# Patient Record
Sex: MALE | Race: WHITE | NOT HISPANIC OR LATINO | Employment: FULL TIME | URBAN - METROPOLITAN AREA
[De-identification: names, ages, dates, MRNs, and addresses within clinical notes are randomized per-mention and may not be internally consistent; named-entity substitution may affect disease eponyms.]

---

## 2023-05-08 LAB
AMPHETAMINE (PRESENCE) IN URINE BY SCREEN METHOD: NORMAL
BARBITURATES PRESENCE IN URINE BY SCREEN METHOD: NORMAL
BENZODIAZEPINE (PRESENCE) IN URINE BY SCREEN METHOD: NORMAL
CANNABINOIDS IN URINE BY SCREEN METHOD: NORMAL
COCAINE (PRESENCE) IN URINE BY SCREEN METHOD: NORMAL
DRUG SCREEN COMMENT URINE: NORMAL
FENTANYL URINE: NORMAL
METHADONE (PRESENCE) IN URINE BY SCREEN METHOD: NORMAL
OPIATES (PRESENCE) IN URINE BY SCREEN METHOD: NORMAL
OXYCODONE (PRESENCE) IN URINE BY SCREEN METHOD: NORMAL
PHENCYCLIDINE (PRESENCE) IN URINE BY SCREEN METHOD: NORMAL

## 2023-08-15 ENCOUNTER — OFFICE VISIT (OUTPATIENT)
Dept: PRIMARY CARE | Facility: CLINIC | Age: 28
End: 2023-08-15
Payer: COMMERCIAL

## 2023-08-15 VITALS
OXYGEN SATURATION: 98 % | HEART RATE: 113 BPM | DIASTOLIC BLOOD PRESSURE: 62 MMHG | TEMPERATURE: 97.5 F | RESPIRATION RATE: 16 BRPM | HEIGHT: 69 IN | SYSTOLIC BLOOD PRESSURE: 128 MMHG | BODY MASS INDEX: 2.52 KG/M2 | WEIGHT: 17 LBS

## 2023-08-15 DIAGNOSIS — Z00.00 ROUTINE GENERAL MEDICAL EXAMINATION AT A HEALTH CARE FACILITY: Primary | ICD-10-CM

## 2023-08-15 DIAGNOSIS — D22.9 SUSPICIOUS NEVUS: ICD-10-CM

## 2023-08-15 DIAGNOSIS — L30.9 DERMATITIS: ICD-10-CM

## 2023-08-15 DIAGNOSIS — R53.83 FATIGUE, UNSPECIFIED TYPE: Primary | ICD-10-CM

## 2023-08-15 PROBLEM — F41.9 ANXIETY: Status: ACTIVE | Noted: 2023-08-15

## 2023-08-15 PROBLEM — F90.9 ADHD: Status: ACTIVE | Noted: 2023-08-15

## 2023-08-15 PROBLEM — J45.20 MILD INTERMITTENT ASTHMA WITHOUT COMPLICATION (HHS-HCC): Status: ACTIVE | Noted: 2023-08-15

## 2023-08-15 PROCEDURE — 99214 OFFICE O/P EST MOD 30 MIN: CPT | Performed by: NURSE PRACTITIONER

## 2023-08-15 RX ORDER — DEXTROAMPHETAMINE SACCHARATE, AMPHETAMINE ASPARTATE, DEXTROAMPHETAMINE SULFATE AND AMPHETAMINE SULFATE 2.5; 2.5; 2.5; 2.5 MG/1; MG/1; MG/1; MG/1
10 TABLET ORAL 2 TIMES DAILY
COMMUNITY
End: 2023-10-02

## 2023-08-15 RX ORDER — ALBUTEROL SULFATE 90 UG/1
2 AEROSOL, METERED RESPIRATORY (INHALATION) EVERY 6 HOURS PRN
COMMUNITY

## 2023-08-15 RX ORDER — CLOTRIMAZOLE AND BETAMETHASONE DIPROPIONATE 10; .64 MG/G; MG/G
1 CREAM TOPICAL 2 TIMES DAILY
Qty: 45 G | Refills: 1 | Status: SHIPPED | OUTPATIENT
Start: 2023-08-15 | End: 2024-11-07

## 2023-08-15 RX ORDER — DEXTROAMPHETAMINE SACCHARATE, AMPHETAMINE ASPARTATE, DEXTROAMPHETAMINE SULFATE AND AMPHETAMINE SULFATE 1.25; 1.25; 1.25; 1.25 MG/1; MG/1; MG/1; MG/1
5 TABLET ORAL
COMMUNITY
End: 2023-10-02

## 2023-08-15 ASSESSMENT — ANXIETY QUESTIONNAIRES
4. TROUBLE RELAXING: NOT AT ALL
2. NOT BEING ABLE TO STOP OR CONTROL WORRYING: NEARLY EVERY DAY
6. BECOMING EASILY ANNOYED OR IRRITABLE: NEARLY EVERY DAY
7. FEELING AFRAID AS IF SOMETHING AWFUL MIGHT HAPPEN: NOT AT ALL
GAD7 TOTAL SCORE: 10
1. FEELING NERVOUS, ANXIOUS, OR ON EDGE: SEVERAL DAYS
3. WORRYING TOO MUCH ABOUT DIFFERENT THINGS: NOT AT ALL
IF YOU CHECKED OFF ANY PROBLEMS ON THIS QUESTIONNAIRE, HOW DIFFICULT HAVE THESE PROBLEMS MADE IT FOR YOU TO DO YOUR WORK, TAKE CARE OF THINGS AT HOME, OR GET ALONG WITH OTHER PEOPLE: EXTREMELY DIFFICULT
5. BEING SO RESTLESS THAT IT IS HARD TO SIT STILL: NEARLY EVERY DAY

## 2023-08-15 ASSESSMENT — ENCOUNTER SYMPTOMS
CONSTITUTIONAL NEGATIVE: 1
WHEEZING: 0
SHORTNESS OF BREATH: 0

## 2023-08-15 ASSESSMENT — PATIENT HEALTH QUESTIONNAIRE - PHQ9
SUM OF ALL RESPONSES TO PHQ9 QUESTIONS 1 AND 2: 5
8. MOVING OR SPEAKING SO SLOWLY THAT OTHER PEOPLE COULD HAVE NOTICED. OR THE OPPOSITE, BEING SO FIGETY OR RESTLESS THAT YOU HAVE BEEN MOVING AROUND A LOT MORE THAN USUAL: NEARLY EVERY DAY
SUM OF ALL RESPONSES TO PHQ QUESTIONS 1-9: 20
7. TROUBLE CONCENTRATING ON THINGS, SUCH AS READING THE NEWSPAPER OR WATCHING TELEVISION: NOT AT ALL
3. TROUBLE FALLING OR STAYING ASLEEP OR SLEEPING TOO MUCH: NEARLY EVERY DAY
5. POOR APPETITE OR OVEREATING: NOT AT ALL
2. FEELING DOWN, DEPRESSED OR HOPELESS: MORE THAN HALF THE DAYS
4. FEELING TIRED OR HAVING LITTLE ENERGY: NEARLY EVERY DAY
6. FEELING BAD ABOUT YOURSELF - OR THAT YOU ARE A FAILURE OR HAVE LET YOURSELF OR YOUR FAMILY DOWN: NEARLY EVERY DAY
1. LITTLE INTEREST OR PLEASURE IN DOING THINGS: NEARLY EVERY DAY
9. THOUGHTS THAT YOU WOULD BE BETTER OFF DEAD, OR OF HURTING YOURSELF: NEARLY EVERY DAY
10. IF YOU CHECKED OFF ANY PROBLEMS, HOW DIFFICULT HAVE THESE PROBLEMS MADE IT FOR YOU TO DO YOUR WORK, TAKE CARE OF THINGS AT HOME, OR GET ALONG WITH OTHER PEOPLE: EXTREMELY DIFFICULT

## 2023-08-15 ASSESSMENT — PAIN SCALES - GENERAL: PAINLEVEL: 4

## 2023-08-15 NOTE — PROGRESS NOTES
Subjective   Patient ID: Geovany Segundo is a 27 y.o. male who presents for No chief complaint on file..  Last physical: 3/3/23    Did pt do labs from march? NO  Is pt fasting? NO    Current sx trouble waking up/verbally abusive in morning. Drowsy throughout day  When did sx start Last 2yrs  Does pt have a new psychiatrist? Currently has not met new provider  Looks like [employer] left       Phq9=17, gad7=10    HPI  Lethargic during day when not on adderall    Hard time waking up   When gets up verbally abusive    Once medicine kicks in, then feels normal    Did sleep apnea test 3yrs ago-normal  Has used a cpap in the past    Last use of adderall-today  Uds 5/8/23  Csa 5/8/23    Tingling in hands    Rash x 1mon    no delusions, hallucinations, uncontrollable/purposeless mvmts, or fixed/inflexible posture  no extreme mood swings that include emotional highs and lows,      Abnormally upbeat, jumpy or wired,      Increased activity, energy or agitation,      Exaggerated sense of well-being and self-confidence (euphoria),      Irritable,      Decreased need for sleep,      Unusual talkativeness,      Racing thoughts,      Distractibility,      Poor decision-making - for example, going on buying sprees, taking sexual risks or making foolish investments           affects sleep, energy, activity, judgment, behavior and the ability to think clearly.    sx younger children- may include sadness, irritability, temper tantrums, aggression, clinginess, worry, aches and pains, refusing to go to school, or being underweight.  sx teens-may include sadness, irritability, feeling negative and worthless, anger, poor performance or poor attendance at school, feeling misunderstood and extremely sensitive, using recreational drugs or alcohol, eating or sleeping too much, self-harm, loss of interest in normal activities, and avoidance of social interaction.  sx older adults-Memory difficulties or personality changes    sx affecting  day-to-day activities, such as work, school, social activities or relationships with others-  anyone noticing your sx-    last labs-  not on prednisone or BCP  no new meds or otc meds    Social history:  Marital status  Children  Lives with  Education  Job  Grades in school  Trouble at school    Substances:  Alcohol-  illegal drug use-  Tobacco-  Caffeine-    Past medical history:  h/o trauma  Pain  chronic dis-    No care team member to display     Review of Systems   Constitutional: Negative.    Respiratory:  Negative for shortness of breath and wheezing.    Cardiovascular:  Negative for chest pain.   Skin:  Positive for rash.        Mole inner lf lower leg       Objective   There were no vitals taken for this visit.   BP Readings from Last 3 Encounters:   05/08/23 140/75   03/03/23 135/78   10/05/21 120/76     Wt Readings from Last 3 Encounters:   05/08/23 86.2 kg (190 lb 1 oz)   03/03/23 88.5 kg (195 lb)   10/05/21 70.3 kg (155 lb)       Physical Exam  Constitutional:       General: He is not in acute distress.     Appearance: Normal appearance.   Skin:     Findings: Rash (noted on rt side and back) present.      Comments: 7mm raised area with dryness in center on inner left lower leg   Neurological:      Mental Status: He is alert.       Assessment/Plan   Diagnoses and all orders for this visit:  Fatigue, unspecified type  -     Referral to Adult Sleep Medicine; Future  -     Vitamin B12; Future  -     Vitamin D 25-Hydroxy,Total; Future  Dermatitis  -     clotrimazole-betamethasone (Lotrisone) cream; Apply 1 Application topically 2 times a day. Use no longer than 2wks.  -     Referral to Dermatology; Future  Suspicious nevus  -     Referral to Dermatology; Future  Other orders  -     Follow Up In Primary Care - Health Maintenance; Future

## 2023-08-15 NOTE — PATIENT INSTRUCTIONS
Labs when fasting    Multivitamin otc    Carpal tunnel exercises  Carpal tunnel brace otc for night use if needed    See sleep     Cream for rash  See derm for spot on lf leg and discuss rash too if  not going away with the cream    Make appt with psych NP    Return in march for wellness appt      I will communicate with you via [employer] regarding messages and results. If you need help with this, you can call the support line at [phone].    IT WAS A PLEASURE TO SEE YOU TODAY. THANK YOU FOR CHOOSING US FOR YOUR HEALTHCARE NEEDS.

## 2023-10-02 ENCOUNTER — OFFICE VISIT (OUTPATIENT)
Dept: BEHAVIORAL HEALTH | Facility: CLINIC | Age: 28
End: 2023-10-02
Payer: COMMERCIAL

## 2023-10-02 VITALS — HEIGHT: 69 IN | SYSTOLIC BLOOD PRESSURE: 169 MMHG | DIASTOLIC BLOOD PRESSURE: 72 MMHG | BODY MASS INDEX: 2.51 KG/M2

## 2023-10-02 DIAGNOSIS — F90.0 ATTENTION DEFICIT HYPERACTIVITY DISORDER (ADHD), PREDOMINANTLY INATTENTIVE TYPE: ICD-10-CM

## 2023-10-02 PROCEDURE — 99214 OFFICE O/P EST MOD 30 MIN: CPT

## 2023-10-02 RX ORDER — DEXTROAMPHETAMINE SACCHARATE, AMPHETAMINE ASPARTATE, DEXTROAMPHETAMINE SULFATE AND AMPHETAMINE SULFATE 7.5; 7.5; 7.5; 7.5 MG/1; MG/1; MG/1; MG/1
30 TABLET ORAL 2 TIMES DAILY
Qty: 60 TABLET | Refills: 0 | Status: SHIPPED | OUTPATIENT
Start: 2023-10-02 | End: 2023-10-02 | Stop reason: SDUPTHER

## 2023-10-02 RX ORDER — DEXTROAMPHETAMINE SACCHARATE, AMPHETAMINE ASPARTATE, DEXTROAMPHETAMINE SULFATE AND AMPHETAMINE SULFATE 7.5; 7.5; 7.5; 7.5 MG/1; MG/1; MG/1; MG/1
30 TABLET ORAL 2 TIMES DAILY
Qty: 60 TABLET | Refills: 0 | Status: SHIPPED | OUTPATIENT
Start: 2023-10-02 | End: 2023-10-30 | Stop reason: ENTERED-IN-ERROR

## 2023-10-02 RX ORDER — DEXTROAMPHETAMINE SACCHARATE, AMPHETAMINE ASPARTATE, DEXTROAMPHETAMINE SULFATE AND AMPHETAMINE SULFATE 7.5; 7.5; 7.5; 7.5 MG/1; MG/1; MG/1; MG/1
30 TABLET ORAL 2 TIMES DAILY
Qty: 60 TABLET | Refills: 0 | Status: SHIPPED | OUTPATIENT
Start: 2023-10-02 | End: 2023-10-02 | Stop reason: ENTERED-IN-ERROR

## 2023-10-02 RX ORDER — DEXTROAMPHETAMINE SACCHARATE, AMPHETAMINE ASPARTATE MONOHYDRATE, DEXTROAMPHETAMINE SULFATE AND AMPHETAMINE SULFATE 5; 5; 5; 5 MG/1; MG/1; MG/1; MG/1
20 CAPSULE, EXTENDED RELEASE ORAL ONCE
Status: DISCONTINUED | OUTPATIENT
Start: 2023-10-02 | End: 2023-10-02

## 2023-10-02 RX ORDER — DEXTROAMPHETAMINE SACCHARATE, AMPHETAMINE ASPARTATE, DEXTROAMPHETAMINE SULFATE AND AMPHETAMINE SULFATE 7.5; 7.5; 7.5; 7.5 MG/1; MG/1; MG/1; MG/1
30 TABLET ORAL 2 TIMES DAILY
COMMUNITY
Start: 2023-10-02 | End: 2023-10-02 | Stop reason: ENTERED-IN-ERROR

## 2023-10-02 ASSESSMENT — ENCOUNTER SYMPTOMS
LIGHT-HEADEDNESS: 0
WHEEZING: 0
PHOTOPHOBIA: 0
NAUSEA: 0
HALLUCINATIONS: 0
MUSCULOSKELETAL NEGATIVE: 1
SLEEP DISTURBANCE: 0
FEVER: 0
ABDOMINAL DISTENTION: 0
DECREASED CONCENTRATION: 1
EYE REDNESS: 1
NERVOUS/ANXIOUS: 0
DIARRHEA: 0
APNEA: 0
CONSTIPATION: 0
VOMITING: 0
HEADACHES: 0
UNEXPECTED WEIGHT CHANGE: 1
DIAPHORESIS: 0
CARDIOVASCULAR NEGATIVE: 1
APPETITE CHANGE: 0
ABDOMINAL PAIN: 0
STRIDOR: 0
SEIZURES: 0
CHEST TIGHTNESS: 0
EYE DISCHARGE: 0
EYE PAIN: 1
FATIGUE: 1
FACIAL ASYMMETRY: 0
EYE ITCHING: 0
HEMATOLOGIC/LYMPHATIC NEGATIVE: 1
SHORTNESS OF BREATH: 0
DYSPHORIC MOOD: 0
COUGH: 0
HYPERACTIVE: 0
RHINORRHEA: 1
ENDOCRINE NEGATIVE: 1
SINUS PAIN: 1

## 2023-10-02 ASSESSMENT — PATIENT HEALTH QUESTIONNAIRE - PHQ9
8. MOVING OR SPEAKING SO SLOWLY THAT OTHER PEOPLE COULD HAVE NOTICED. OR THE OPPOSITE, BEING SO FIGETY OR RESTLESS THAT YOU HAVE BEEN MOVING AROUND A LOT MORE THAN USUAL: NOT AT ALL
SUM OF ALL RESPONSES TO PHQ9 QUESTIONS 1 & 2: 1
7. TROUBLE CONCENTRATING ON THINGS, SUCH AS READING THE NEWSPAPER OR WATCHING TELEVISION: SEVERAL DAYS
6. FEELING BAD ABOUT YOURSELF - OR THAT YOU ARE A FAILURE OR HAVE LET YOURSELF OR YOUR FAMILY DOWN: NOT AT ALL
4. FEELING TIRED OR HAVING LITTLE ENERGY: SEVERAL DAYS
9. THOUGHTS THAT YOU WOULD BE BETTER OFF DEAD, OR OF HURTING YOURSELF: NOT AT ALL
1. LITTLE INTEREST OR PLEASURE IN DOING THINGS: SEVERAL DAYS
2. FEELING DOWN, DEPRESSED OR HOPELESS: SEVERAL DAYS
1. LITTLE INTEREST OR PLEASURE IN DOING THINGS: NOT AT ALL
10. IF YOU CHECKED OFF ANY PROBLEMS, HOW DIFFICULT HAVE THESE PROBLEMS MADE IT FOR YOU TO DO YOUR WORK, TAKE CARE OF THINGS AT HOME, OR GET ALONG WITH OTHER PEOPLE: SOMEWHAT DIFFICULT
5. POOR APPETITE OR OVEREATING: NOT AT ALL
3. TROUBLE FALLING OR STAYING ASLEEP OR SLEEPING TOO MUCH: SEVERAL DAYS
2. FEELING DOWN, DEPRESSED OR HOPELESS: SEVERAL DAYS
10. IF YOU CHECKED OFF ANY PROBLEMS, HOW DIFFICULT HAVE THESE PROBLEMS MADE IT FOR YOU TO DO YOUR WORK, TAKE CARE OF THINGS AT HOME, OR GET ALONG WITH OTHER PEOPLE: SOMEWHAT DIFFICULT

## 2023-10-02 ASSESSMENT — LIFESTYLE VARIABLES
HOW OFTEN DURING THE LAST YEAR HAVE YOU FAILED TO DO WHAT WAS NORMALLY EXPECTED FROM YOU BECAUSE OF DRINKING: NEVER
HOW MANY STANDARD DRINKS CONTAINING ALCOHOL DO YOU HAVE ON A TYPICAL DAY: 5 OR 6
SKIP TO QUESTIONS 9-10: 0
HOW OFTEN DURING THE LAST YEAR HAVE YOU HAD A FEELING OF GUILT OR REMORSE AFTER DRINKING: NEVER
AUDIT TOTAL SCORE: 5
HOW OFTEN DURING THE LAST YEAR HAVE YOU BEEN UNABLE TO REMEMBER WHAT HAPPENED THE NIGHT BEFORE BECAUSE YOU HAD BEEN DRINKING: NEVER
AUDIT-C TOTAL SCORE: 5
AUDIT-C TOTAL SCORE: 5
HOW OFTEN DO YOU HAVE SIX OR MORE DRINKS ON ONE OCCASION: NEVER
HOW OFTEN DURING THE LAST YEAR HAVE YOU NEEDED AN ALCOHOLIC DRINK FIRST THING IN THE MORNING TO GET YOURSELF GOING AFTER A NIGHT OF HEAVY DRINKING: NEVER
HOW OFTEN DURING THE LAST YEAR HAVE YOU FOUND THAT YOU WERE NOT ABLE TO STOP DRINKING ONCE YOU HAD STARTED: NEVER
HAVE YOU OR SOMEONE ELSE BEEN INJURED AS A RESULT OF YOUR DRINKING: NO
HAS A RELATIVE, FRIEND, DOCTOR, OR ANOTHER HEALTH PROFESSIONAL EXPRESSED CONCERN ABOUT YOUR DRINKING OR SUGGESTED YOU CUT DOWN: NO
HOW OFTEN DO YOU HAVE A DRINK CONTAINING ALCOHOL: 2-3 TIMES A WEEK

## 2023-10-02 ASSESSMENT — ANXIETY QUESTIONNAIRES
GAD7 TOTAL SCORE: 0
7. FEELING AFRAID AS IF SOMETHING AWFUL MIGHT HAPPEN: NOT AT ALL
4. TROUBLE RELAXING: NOT AT ALL
6. BECOMING EASILY ANNOYED OR IRRITABLE: NOT AT ALL
1. FEELING NERVOUS, ANXIOUS, OR ON EDGE: NOT AT ALL
3. WORRYING TOO MUCH ABOUT DIFFERENT THINGS: NOT AT ALL
5. BEING SO RESTLESS THAT IT IS HARD TO SIT STILL: NOT AT ALL
2. NOT BEING ABLE TO STOP OR CONTROL WORRYING: NOT AT ALL

## 2023-10-02 ASSESSMENT — COLUMBIA-SUICIDE SEVERITY RATING SCALE - C-SSRS
1. HAVE YOU WISHED YOU WERE DEAD OR WISHED YOU COULD GO TO SLEEP AND NOT WAKE UP?: NO
ATTEMPT LIFETIME: NO
2. HAVE YOU ACTUALLY HAD ANY THOUGHTS OF KILLING YOURSELF?: NO
TOTAL  NUMBER OF INTERRUPTED ATTEMPTS LIFETIME: NO
TOTAL  NUMBER OF ABORTED OR SELF INTERRUPTED ATTEMPTS LIFETIME: NO
6. HAVE YOU EVER DONE ANYTHING, STARTED TO DO ANYTHING, OR PREPARED TO DO ANYTHING TO END YOUR LIFE?: NO

## 2023-10-02 NOTE — PROGRESS NOTES
"Subjective   Patient is a 27 year old male presenting to outpatient psychiatry for follow up appointment for ADHD.     Patient was a patient of Luis CRAWFORD who has recently left the  Department of Psychiatry practice. This is first visit with his provider but will be considered a follow up appointment.     \"Concentration problems\"     Patient provided 2 personal identifiers prior to virtual Zoom teleconferencing visit.     Patient reports that he noticed  partial improvement with the initial dose of Adderall but he still experienced problems with concentration and distractibility. He experiences problems problems with alertness and he finds it difficult to get out of bed in the morning. He denies depression or anxiety as well as SI/HI or panic attacks.   .   Patient lives in [place] and he lives with his girlfriend Beatrice and her 2 children. They have been together since 2019. Beatrice receives  dialysis  Monday, Wednesday, and  Friday and has been for the past several years as a consequence of treatment for Hodgkin's Lymphoma   \  Patient reports that felt  depressed as a child when ADHD wasn't control.led Patient reports that \"he has been having a hard time \"getting his  act together and he fears that he has disappointed his father.     Background History     Patient is a native of the [place] area and he grew up with his parents, Mathieu and Kenzie. His father is 54 and his mother is 53. The two  several years ago. He has a brother named John (21) and a sister named Cleopatra (26).     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 He is the only  with the company.     Substance Use   Caffeine - 300 mg a day  Smokes - tobacco - pack a day   Alcohol - may drink twice a week at most - 6-8 beers   Marijuana - none   Other Drug Use - none         HPI  Review of Systems "   Constitutional:  Positive for fatigue and unexpected weight change. Negative for appetite change, diaphoresis and fever.   HENT:  Positive for congestion, rhinorrhea, sinus pain and sneezing.    Eyes:  Positive for pain and redness. Negative for photophobia, discharge, itching and visual disturbance.   Respiratory:  Negative for apnea, cough, chest tightness, shortness of breath, wheezing and stridor.    Cardiovascular: Negative.    Gastrointestinal:  Negative for abdominal distention, abdominal pain, constipation, diarrhea, nausea and vomiting.   Endocrine: Negative.    Genitourinary: Negative.    Musculoskeletal: Negative.    Skin: Negative.    Neurological:  Negative for seizures, facial asymmetry, light-headedness and headaches.   Hematological: Negative.    Psychiatric/Behavioral:  Positive for decreased concentration. Negative for dysphoric mood, hallucinations, self-injury, sleep disturbance and suicidal ideas. The patient is not nervous/anxious and is not hyperactive.        Objective   Physical Exam  Constitutional:       Appearance: Normal appearance. He is normal weight.   HENT:      Head: Normocephalic.   Cardiovascular:      Rate and Rhythm: Normal rate.   Pulmonary:      Effort: Pulmonary effort is normal.   Abdominal:      General: Abdomen is flat.   Musculoskeletal:         General: Normal range of motion.   Skin:     Coloration: Skin is not jaundiced or pale.      Findings: No bruising, erythema, lesion or rash.   Neurological:      General: No focal deficit present.      Mental Status: He is alert and oriented to person, place, and time.      GCS: GCS eye subscore is 4. GCS verbal subscore is 5. GCS motor subscore is 6.      Gait: Gait is intact.   Psychiatric:         Attention and Perception: Perception normal. He is inattentive.         Mood and Affect: Mood and affect normal.         Speech: Speech normal.         Behavior: Behavior normal. Behavior is cooperative.         Thought Content:  Thought content normal.         Cognition and Memory: Cognition and memory normal. He does not exhibit impaired recent memory.         Judgment: Judgment normal.         Lab Review:   Orders Only on 05/08/2023   Component Date Value    DRUG SCREEN COMMENT URINE 05/08/2023 SEE BELOW     Amphetamine Screen, Urine 05/08/2023 PRESUMPTIVE NEGATIVE     Barbiturate Screen, Urine 05/08/2023 PRESUMPTIVE NEGATIVE     BENZODIAZEPINE (PRESENCE* 05/08/2023 PRESUMPTIVE NEGATIVE     Cannabinoid Screen, Urine 05/08/2023 PRESUMPTIVE NEGATIVE     Cocaine Screen, Urine 05/08/2023 PRESUMPTIVE NEGATIVE     Fentanyl, Ur 05/08/2023 PRESUMPTIVE NEGATIVE     Methadone Screen, Urine 05/08/2023 PRESUMPTIVE NEGATIVE     Opiate Screen, Urine 05/08/2023 PRESUMPTIVE NEGATIVE     Oxycodone Screen, Ur 05/08/2023 PRESUMPTIVE NEGATIVE     PCP Screen, Urine 05/08/2023 PRESUMPTIVE NEGATIVE        Assessment/Plan   Diagnoses and all orders for this visit:  Attention deficit hyperactivity disorder (ADHD), predominantly inattentive type  -     amphetamine-dextroamphetamine XR (Adderall XR) 24 hr capsule 20 mg  -     amphetamine-dextroamphetamine (AdderalL) 30 mg tablet; Take 1 tablet (30 mg) by mouth 2 times a day.

## 2023-10-30 ENCOUNTER — APPOINTMENT (OUTPATIENT)
Dept: BEHAVIORAL HEALTH | Facility: CLINIC | Age: 28
End: 2023-10-30
Payer: COMMERCIAL

## 2023-10-30 RX ORDER — DEXTROAMPHETAMINE SACCHARATE, AMPHETAMINE ASPARTATE, DEXTROAMPHETAMINE SULFATE AND AMPHETAMINE SULFATE 7.5; 7.5; 7.5; 7.5 MG/1; MG/1; MG/1; MG/1
30 TABLET ORAL
Qty: 60 TABLET | Refills: 0 | Status: SHIPPED | OUTPATIENT
Start: 2023-10-30 | End: 2023-11-30 | Stop reason: SDUPTHER

## 2023-11-16 ENCOUNTER — TELEMEDICINE (OUTPATIENT)
Dept: BEHAVIORAL HEALTH | Facility: CLINIC | Age: 28
End: 2023-11-16
Payer: COMMERCIAL

## 2023-11-16 DIAGNOSIS — F90.0 ATTENTION DEFICIT HYPERACTIVITY DISORDER (ADHD), PREDOMINANTLY INATTENTIVE TYPE: ICD-10-CM

## 2023-11-16 PROCEDURE — 99214 OFFICE O/P EST MOD 30 MIN: CPT

## 2023-11-16 NOTE — PROGRESS NOTES
"Subjective   Patient is a 27 year old male presenting to outpatient psychiatry for follow up appointment for ADHD.     \"Concentration problems\"    Patient provided 2 personal identifiers prior to virtual  teleconferencing visit.     Will need to check with prior authorization to get brand name Adderall. Patient feels  that he did better with the brand name of Adderall when he was younger but it hasn't been in stock. Pharmacy contacted during appointment with pharmacist reporting that there are not Brand medications in the system at this time.     Patient reports that his concentration and focus has improved and he finds that he is less distracted. He doesn't feel that the effect is strongly effective, he was hoping that he would notice more of a difference.     Patient is using techniques, such as reminders, to help with focus and decrease distractibility to use as an adjunct,     Patient denies low mood or anxiety and he denies experiencing panic attacks. No suicidal or homicidal ideation.     HPI  Review of Systems   Constitutional:  Positive for fatigue and unexpected weight change. Negative for appetite change, diaphoresis and fever.   HENT:  Positive for congestion, rhinorrhea, sinus pain and sneezing.    Eyes:  Positive for pain and redness. Negative for photophobia, discharge, itching and visual disturbance.   Respiratory:  Negative for apnea, cough, chest tightness, shortness of breath, wheezing and stridor.    Cardiovascular: Negative.    Gastrointestinal:  Negative for abdominal distention, abdominal pain, constipation, diarrhea, nausea and vomiting.   Endocrine: Negative.    Genitourinary: Negative.    Musculoskeletal: Negative.    Skin: Negative.    Neurological:  Negative for seizures, facial asymmetry, light-headedness and headaches.   Hematological: Negative.    Psychiatric/Behavioral:  Positive for decreased concentration. Negative for dysphoric mood, hallucinations, self-injury, sleep disturbance " and suicidal ideas. The patient is not nervous/anxious and is not hyperactive.        Objective   Physical Exam  Constitutional:       Appearance: Normal appearance. He is normal weight.   HENT:      Head: Normocephalic.   Cardiovascular:      Rate and Rhythm: Normal rate.   Pulmonary:      Effort: Pulmonary effort is normal.   Abdominal:      General: Abdomen is flat.   Musculoskeletal:         General: Normal range of motion.   Skin:     Coloration: Skin is not jaundiced or pale.      Findings: No bruising, erythema, lesion or rash.   Neurological:      General: No focal deficit present.      Mental Status: He is alert and oriented to person, place, and time.      GCS: GCS eye subscore is 4. GCS verbal subscore is 5. GCS motor subscore is 6.      Gait: Gait is intact.   Psychiatric:         Attention and Perception: Perception normal. He is inattentive.         Mood and Affect: Mood and affect normal.         Speech: Speech normal.         Behavior: Behavior normal. Behavior is cooperative.         Thought Content: Thought content normal.         Cognition and Memory: Cognition and memory normal. He does not exhibit impaired recent memory.         Judgment: Judgment normal.         Lab Review:   Orders Only on 05/08/2023   Component Date Value    DRUG SCREEN COMMENT URINE 05/08/2023 SEE BELOW     Amphetamine Screen, Urine 05/08/2023 PRESUMPTIVE NEGATIVE     Barbiturate Screen, Urine 05/08/2023 PRESUMPTIVE NEGATIVE     BENZODIAZEPINE (PRESENCE* 05/08/2023 PRESUMPTIVE NEGATIVE     Cannabinoid Screen, Urine 05/08/2023 PRESUMPTIVE NEGATIVE     Cocaine Screen, Urine 05/08/2023 PRESUMPTIVE NEGATIVE     Fentanyl, Ur 05/08/2023 PRESUMPTIVE NEGATIVE     Methadone Screen, Urine 05/08/2023 PRESUMPTIVE NEGATIVE     Opiate Screen, Urine 05/08/2023 PRESUMPTIVE NEGATIVE     Oxycodone Screen, Ur 05/08/2023 PRESUMPTIVE NEGATIVE     PCP Screen, Urine 05/08/2023 PRESUMPTIVE NEGATIVE        Assessment/Plan   Safety Assessment -  Safety Assessment: no current SI, (- ) past SI, (- ) self-harm SA, no guns. RF include age, male gender, PF include , connected family, limited substance use history, future focused, hopeful. low risk     Diagnoses and all orders for this visit: Patient reports that Adderall has been partially effective and he is interested in trying the Brand name instead of the generic version. There is no Brand name in stock in the [facility]. Patient provided psycho education regarding same chemical structure of generic and brand name yet patient would like to try the brand name nevertheless. Will try to order if any pharmacy has it in stock.   Attention deficit hyperactivity disorder (ADHD), predominantly inattentive type  -     amphetamine-dextroamphetamine (AdderalL) 30 mg tablet; Take 1 tablet (30 mg) by mouth 2 times a day.    Follow up in 2 months

## 2023-11-17 NOTE — PATIENT INSTRUCTIONS
Reviewed diagnostic impression including subjective and objective data and provided education about Attention Deficit Disorder , etiology, treatment recommendations including medication, therapy, course of treatment and prognosis. Patient amendable to treatment plan.     2..Recommendations - I'll follow up on prior authorization for Brand Name Adderall.       CONTINUE: 30 mg Amphetamine-Dextroamphetamine (Adderall) IR - one tablet daily for ADHD symptoms     3. Labs reviewed and discussed     4. Continue supportive psychotherapy      5. Follow up in two months      May follow up sooner if experiences worsening symptoms by calling  Psychiatry at [phone] or [phone] ([facility])      Patient verbalized an understanding to call [facility] at [phone] ([facility]), [phone], or [phone]/go to the nearest emergency room if experiences thoughts of harm to self or others.

## 2023-11-30 ENCOUNTER — TELEPHONE (OUTPATIENT)
Dept: BEHAVIORAL HEALTH | Facility: CLINIC | Age: 28
End: 2023-11-30
Payer: COMMERCIAL

## 2023-11-30 DIAGNOSIS — F90.0 ATTENTION DEFICIT HYPERACTIVITY DISORDER (ADHD), PREDOMINANTLY INATTENTIVE TYPE: ICD-10-CM

## 2023-11-30 RX ORDER — DEXTROAMPHETAMINE SACCHARATE, AMPHETAMINE ASPARTATE, DEXTROAMPHETAMINE SULFATE AND AMPHETAMINE SULFATE 7.5; 7.5; 7.5; 7.5 MG/1; MG/1; MG/1; MG/1
30 TABLET ORAL
Qty: 60 TABLET | Refills: 0 | Status: SHIPPED | OUTPATIENT
Start: 2023-11-30 | End: 2023-12-07 | Stop reason: SDUPTHER

## 2023-11-30 NOTE — PROGRESS NOTES
[id number] - [place], [place] - [address] [phone]   Message left for patient to call and schedule next appointment.   Right arm; Abdominal pain

## 2023-12-07 ENCOUNTER — APPOINTMENT (OUTPATIENT)
Dept: BEHAVIORAL HEALTH | Facility: CLINIC | Age: 28
End: 2023-12-07
Payer: COMMERCIAL

## 2023-12-07 ENCOUNTER — TELEMEDICINE (OUTPATIENT)
Dept: BEHAVIORAL HEALTH | Facility: CLINIC | Age: 28
End: 2023-12-07
Payer: COMMERCIAL

## 2023-12-07 DIAGNOSIS — F90.0 ATTENTION DEFICIT HYPERACTIVITY DISORDER (ADHD), PREDOMINANTLY INATTENTIVE TYPE: ICD-10-CM

## 2023-12-07 PROCEDURE — 99213 OFFICE O/P EST LOW 20 MIN: CPT

## 2023-12-07 RX ORDER — DEXTROAMPHETAMINE SACCHARATE, AMPHETAMINE ASPARTATE, DEXTROAMPHETAMINE SULFATE AND AMPHETAMINE SULFATE 7.5; 7.5; 7.5; 7.5 MG/1; MG/1; MG/1; MG/1
30 TABLET ORAL
Qty: 60 TABLET | Refills: 0 | Status: SHIPPED | OUTPATIENT
Start: 2023-12-07 | End: 2023-12-30 | Stop reason: SDUPTHER

## 2023-12-07 RX ORDER — DEXTROAMPHETAMINE SACCHARATE, AMPHETAMINE ASPARTATE, DEXTROAMPHETAMINE SULFATE AND AMPHETAMINE SULFATE 5; 5; 5; 5 MG/1; MG/1; MG/1; MG/1
20 TABLET ORAL DAILY
Qty: 30 TABLET | Refills: 0 | Status: SHIPPED | OUTPATIENT
Start: 2023-12-07 | End: 2023-12-30 | Stop reason: SDUPTHER

## 2023-12-07 ASSESSMENT — ENCOUNTER SYMPTOMS
HEADACHES: 0
PHOTOPHOBIA: 0
SEIZURES: 0
ENDOCRINE NEGATIVE: 1
HYPERACTIVE: 0
WEAKNESS: 0
CONSTIPATION: 0
CHILLS: 0
RHINORRHEA: 1
CARDIOVASCULAR NEGATIVE: 1
SORE THROAT: 0
LIGHT-HEADEDNESS: 0
DIARRHEA: 0
FEVER: 0
HALLUCINATIONS: 0
FACIAL ASYMMETRY: 0
ANOREXIA: 0
VERTIGO: 0
NUMBNESS: 0
APPETITE CHANGE: 0
VISUAL CHANGE: 0
EYE DISCHARGE: 0
EYE REDNESS: 1
DYSPHORIC MOOD: 0
WHEEZING: 0
EYE ITCHING: 0
ABDOMINAL PAIN: 0
STRIDOR: 0
MUSCULOSKELETAL NEGATIVE: 1
SLEEP DISTURBANCE: 0
COUGH: 0
HEMATOLOGIC/LYMPHATIC NEGATIVE: 1
NECK PAIN: 0
FATIGUE: 1
NERVOUS/ANXIOUS: 0
SWOLLEN GLANDS: 0
UNEXPECTED WEIGHT CHANGE: 1
NAUSEA: 0
ABDOMINAL DISTENTION: 0
EYE PAIN: 1
ARTHRALGIAS: 0
SHORTNESS OF BREATH: 0
CHEST TIGHTNESS: 0
APNEA: 0
DIAPHORESIS: 0
DECREASED CONCENTRATION: 1
SINUS PAIN: 1
VOMITING: 0

## 2023-12-07 NOTE — PROGRESS NOTES
"Subjective   Patient is a 27 year old male presenting to outpatient psychiatry for follow up appointment for ADHD.     \"Concentration problems\"     Patient provided 2 personal identifiers prior to virtual teleconferencing visit.     Medications are working well.     The dosage is perfect, he also reports that the second dose wears off and he goes to bed early.     Adderall - Takes first does at 630 am,   Second dose at lunch time at Noon     GF's fistula got infected. Loaded hr up on antibiotics.   She has been on dialysis for 12 years. Second on list for transplant.    He has been working a lot of hours to help care for his girlfriend. He is interested in taking a lower dose of Adderall in the early evening so that he has enough energy and focus to help care for his wife in the evening.       ADHD  This is a chronic problem. The current episode started more than 1 year ago. The problem occurs constantly. The problem has been gradually improving. Associated symptoms include fatigue. Pertinent negatives include no abdominal pain, anorexia, arthralgias, chest pain, chills, congestion, coughing, diaphoresis, fever, headaches, nausea, neck pain, numbness, rash, sore throat, swollen glands, urinary symptoms, vertigo, visual change, vomiting or weakness. The treatment provided significant relief.     Review of Systems   Constitutional:  Positive for fatigue and unexpected weight change. Negative for appetite change, chills, diaphoresis and fever.   HENT:  Positive for rhinorrhea, sinus pain and sneezing. Negative for congestion and sore throat.    Eyes:  Positive for pain and redness. Negative for photophobia, discharge, itching and visual disturbance.   Respiratory:  Negative for apnea, cough, chest tightness, shortness of breath, wheezing and stridor.    Cardiovascular: Negative.  Negative for chest pain.   Gastrointestinal:  Negative for abdominal distention, abdominal pain, anorexia, constipation, diarrhea, nausea and " vomiting.   Endocrine: Negative.    Genitourinary: Negative.    Musculoskeletal: Negative.  Negative for arthralgias and neck pain.   Skin: Negative.  Negative for rash.   Neurological:  Negative for vertigo, seizures, facial asymmetry, weakness, light-headedness, numbness and headaches.   Hematological: Negative.    Psychiatric/Behavioral:  Positive for decreased concentration. Negative for dysphoric mood, hallucinations, self-injury, sleep disturbance and suicidal ideas. The patient is not nervous/anxious and is not hyperactive.      Mental Status Examination  General Appearance:  sitting in work truck, wearing cap, dressed in work clothes, good eye contact, appears stated age.  Gait/Station:  not assessed - virtual   Speech: Normal rate, volume, prosody  Mood: Great   Affect: Euthymic, full-range  Thought Process: Linear, goal directed  Thought Associations: No loosening of associations  Thought Content: normal  Perception: No perceptual abnormalities noted  Level of Consciousness: Alert  Orientation: Alert and oriented to person, place, time and situation  Attention and Concentration: Intact  Recent Memory: Intact as evidenced by ability to recall details from the past 24 hours   Remote Memory: Intact as evidenced by ability to recall previous medical issues   Executive function: Intact  Language: Naming intact  Fund of knowledge: Good  Insight:  intact   Judgment: Intact, as evidenced by help-seeking behavior, ability to reason through medical decision making, and compliance with treatment recommendations  Lab Review:   No visits with results within 6 Month(s) from this visit.   Latest known visit with results is:   Orders Only on 05/08/2023   Component Date Value    DRUG SCREEN COMMENT URINE 05/08/2023 SEE BELOW     Amphetamine Screen, Urine 05/08/2023 PRESUMPTIVE NEGATIVE     Barbiturate Screen, Urine 05/08/2023 PRESUMPTIVE NEGATIVE     BENZODIAZEPINE (PRESENCE* 05/08/2023 PRESUMPTIVE NEGATIVE     Cannabinoid  Screen, Urine 05/08/2023 PRESUMPTIVE NEGATIVE     Cocaine Screen, Urine 05/08/2023 PRESUMPTIVE NEGATIVE     Fentanyl, Ur 05/08/2023 PRESUMPTIVE NEGATIVE     Methadone Screen, Urine 05/08/2023 PRESUMPTIVE NEGATIVE     Opiate Screen, Urine 05/08/2023 PRESUMPTIVE NEGATIVE     Oxycodone Screen, Ur 05/08/2023 PRESUMPTIVE NEGATIVE     PCP Screen, Urine 05/08/2023 PRESUMPTIVE NEGATIVE        Assessment/Plan    Safety Assessment: Denies thoughts of SI, plan/intent. no h/o SA. RF include depression, anxiety. PF include living with fiancé, engaged in care, future oriented, no guns. Low imminent risk    Patient reports that he is doing well but his Adderall seems to be wearing off as evening approaches. OARRS checked with no concern for risk or aversion. Will order 20 mg dose for after dinner and reassess in 2-3 months for effectiveness.     Attention deficit Disorder with inattention    Adderall - 30 mg IR - take 1 tablet at breakfast and 1 tablet at lunch  Adderall - 20 mg IR - take 1 tablet at dinner for inattention.       Time    2 minutes chart review  20 minutes direct patient contact  5 minutes charting    27 minutes total time

## 2023-12-07 NOTE — PATIENT INSTRUCTIONS
Reviewed diagnostic impression including subjective and objective data and provided education about attention deficit and anxiety, etiology, treatment recommendations including medication, therapy, course of treatment and prognosis. Patient amendable to treatment plan.    2. . ADHD  Adderall - 30 mg IR - take 1 tablet at breakfast and 1 tablet at lunch  Adderall - 20 mg IR - take 1 tablet at dinner for inattention.       3. Reviewed r/b/a, possible side effects of the medication. Client is aware about the benefit outweighs the risk.    4. PLAN for supportive psychotherapy in the future if needed    5. OARRS reviewed: Compliant, no concern for risk or diversion  -CSA last completed: 5/8/2023  -UDS last completed: 5/8/2023    6. Labs reviewed     - Follow up with physical health providers as scheduled  - May follow up sooner if experiences worsening symptoms by calling  Psychiatry at [phone]  - Patient verbalized an understanding to call [facility] at [phone] ([facility]), [phone], or [phone]/go to the nearest emergency room if experiences thoughts of harm to self or others.

## 2023-12-30 DIAGNOSIS — F90.0 ATTENTION DEFICIT HYPERACTIVITY DISORDER (ADHD), PREDOMINANTLY INATTENTIVE TYPE: ICD-10-CM

## 2023-12-30 RX ORDER — DEXTROAMPHETAMINE SACCHARATE, AMPHETAMINE ASPARTATE, DEXTROAMPHETAMINE SULFATE AND AMPHETAMINE SULFATE 5; 5; 5; 5 MG/1; MG/1; MG/1; MG/1
20 TABLET ORAL DAILY
Qty: 30 TABLET | Refills: 0 | Status: SHIPPED | OUTPATIENT
Start: 2023-12-30 | End: 2024-02-27 | Stop reason: SDUPTHER

## 2023-12-30 RX ORDER — DEXTROAMPHETAMINE SACCHARATE, AMPHETAMINE ASPARTATE, DEXTROAMPHETAMINE SULFATE AND AMPHETAMINE SULFATE 7.5; 7.5; 7.5; 7.5 MG/1; MG/1; MG/1; MG/1
30 TABLET ORAL
Qty: 60 TABLET | Refills: 0 | Status: SHIPPED | OUTPATIENT
Start: 2023-12-30 | End: 2024-02-27 | Stop reason: SDUPTHER

## 2024-02-27 ENCOUNTER — TELEPHONE (OUTPATIENT)
Dept: BEHAVIORAL HEALTH | Facility: CLINIC | Age: 29
End: 2024-02-27
Payer: COMMERCIAL

## 2024-02-27 DIAGNOSIS — F90.0 ATTENTION DEFICIT HYPERACTIVITY DISORDER (ADHD), PREDOMINANTLY INATTENTIVE TYPE: ICD-10-CM

## 2024-02-27 RX ORDER — DEXTROAMPHETAMINE SACCHARATE, AMPHETAMINE ASPARTATE, DEXTROAMPHETAMINE SULFATE AND AMPHETAMINE SULFATE 5; 5; 5; 5 MG/1; MG/1; MG/1; MG/1
20 TABLET ORAL DAILY
Qty: 30 TABLET | Refills: 0 | Status: CANCELLED | OUTPATIENT
Start: 2024-02-27 | End: 2024-03-28

## 2024-02-27 RX ORDER — DEXTROAMPHETAMINE SACCHARATE, AMPHETAMINE ASPARTATE, DEXTROAMPHETAMINE SULFATE AND AMPHETAMINE SULFATE 5; 5; 5; 5 MG/1; MG/1; MG/1; MG/1
20 TABLET ORAL DAILY
Qty: 30 TABLET | Refills: 0 | Status: SHIPPED | OUTPATIENT
Start: 2024-02-27 | End: 2024-03-29 | Stop reason: SDUPTHER

## 2024-02-27 RX ORDER — DEXTROAMPHETAMINE SACCHARATE, AMPHETAMINE ASPARTATE, DEXTROAMPHETAMINE SULFATE AND AMPHETAMINE SULFATE 7.5; 7.5; 7.5; 7.5 MG/1; MG/1; MG/1; MG/1
30 TABLET ORAL
Qty: 60 TABLET | Refills: 0 | Status: CANCELLED | OUTPATIENT
Start: 2024-02-27 | End: 2024-03-28

## 2024-02-27 RX ORDER — DEXTROAMPHETAMINE SACCHARATE, AMPHETAMINE ASPARTATE, DEXTROAMPHETAMINE SULFATE AND AMPHETAMINE SULFATE 7.5; 7.5; 7.5; 7.5 MG/1; MG/1; MG/1; MG/1
30 TABLET ORAL
Qty: 60 TABLET | Refills: 0 | Status: SHIPPED | OUTPATIENT
Start: 2024-02-27 | End: 2024-03-29 | Stop reason: SDUPTHER

## 2024-03-07 ENCOUNTER — APPOINTMENT (OUTPATIENT)
Dept: BEHAVIORAL HEALTH | Facility: CLINIC | Age: 29
End: 2024-03-07
Payer: COMMERCIAL

## 2024-03-22 ENCOUNTER — APPOINTMENT (OUTPATIENT)
Dept: BEHAVIORAL HEALTH | Facility: CLINIC | Age: 29
End: 2024-03-22
Payer: COMMERCIAL

## 2024-03-28 DIAGNOSIS — F90.0 ATTENTION DEFICIT HYPERACTIVITY DISORDER (ADHD), PREDOMINANTLY INATTENTIVE TYPE: ICD-10-CM

## 2024-03-29 DIAGNOSIS — F90.0 ATTENTION DEFICIT HYPERACTIVITY DISORDER (ADHD), PREDOMINANTLY INATTENTIVE TYPE: ICD-10-CM

## 2024-03-29 RX ORDER — DEXTROAMPHETAMINE SACCHARATE, AMPHETAMINE ASPARTATE, DEXTROAMPHETAMINE SULFATE AND AMPHETAMINE SULFATE 5; 5; 5; 5 MG/1; MG/1; MG/1; MG/1
20 TABLET ORAL DAILY
Qty: 30 TABLET | Refills: 0 | Status: SHIPPED | OUTPATIENT
Start: 2024-03-29 | End: 2024-04-28

## 2024-03-29 RX ORDER — DEXTROAMPHETAMINE SACCHARATE, AMPHETAMINE ASPARTATE, DEXTROAMPHETAMINE SULFATE AND AMPHETAMINE SULFATE 7.5; 7.5; 7.5; 7.5 MG/1; MG/1; MG/1; MG/1
30 TABLET ORAL
Qty: 60 TABLET | Refills: 0 | Status: SHIPPED | OUTPATIENT
Start: 2024-03-29 | End: 2024-04-28

## 2024-03-29 RX ORDER — DEXTROAMPHETAMINE SACCHARATE, AMPHETAMINE ASPARTATE, DEXTROAMPHETAMINE SULFATE AND AMPHETAMINE SULFATE 5; 5; 5; 5 MG/1; MG/1; MG/1; MG/1
1 TABLET ORAL DAILY
Qty: 30 TABLET | Refills: 0 | Status: SHIPPED | OUTPATIENT
Start: 2024-03-29 | End: 2024-05-31

## 2024-03-29 RX ORDER — DEXTROAMPHETAMINE SACCHARATE, AMPHETAMINE ASPARTATE, DEXTROAMPHETAMINE SULFATE AND AMPHETAMINE SULFATE 7.5; 7.5; 7.5; 7.5 MG/1; MG/1; MG/1; MG/1
TABLET ORAL
Qty: 60 TABLET | Refills: 0 | Status: SHIPPED | OUTPATIENT
Start: 2024-03-29 | End: 2024-05-31

## 2024-05-31 DIAGNOSIS — F90.0 ATTENTION DEFICIT HYPERACTIVITY DISORDER (ADHD), PREDOMINANTLY INATTENTIVE TYPE: ICD-10-CM

## 2024-05-31 RX ORDER — DEXTROAMPHETAMINE SACCHARATE, AMPHETAMINE ASPARTATE, DEXTROAMPHETAMINE SULFATE AND AMPHETAMINE SULFATE 7.5; 7.5; 7.5; 7.5 MG/1; MG/1; MG/1; MG/1
TABLET ORAL
Qty: 60 TABLET | Refills: 0 | Status: SHIPPED | OUTPATIENT
Start: 2024-05-31

## 2024-05-31 RX ORDER — DEXTROAMPHETAMINE SACCHARATE, AMPHETAMINE ASPARTATE, DEXTROAMPHETAMINE SULFATE AND AMPHETAMINE SULFATE 5; 5; 5; 5 MG/1; MG/1; MG/1; MG/1
1 TABLET ORAL DAILY
Qty: 30 TABLET | Refills: 0 | Status: SHIPPED | OUTPATIENT
Start: 2024-05-31

## 2024-07-01 DIAGNOSIS — F90.0 ATTENTION DEFICIT HYPERACTIVITY DISORDER (ADHD), PREDOMINANTLY INATTENTIVE TYPE: ICD-10-CM

## 2024-07-02 RX ORDER — DEXTROAMPHETAMINE SACCHARATE, AMPHETAMINE ASPARTATE, DEXTROAMPHETAMINE SULFATE AND AMPHETAMINE SULFATE 7.5; 7.5; 7.5; 7.5 MG/1; MG/1; MG/1; MG/1
TABLET ORAL
Qty: 60 TABLET | Refills: 0 | OUTPATIENT
Start: 2024-07-02

## 2024-07-02 RX ORDER — DEXTROAMPHETAMINE SACCHARATE, AMPHETAMINE ASPARTATE, DEXTROAMPHETAMINE SULFATE AND AMPHETAMINE SULFATE 5; 5; 5; 5 MG/1; MG/1; MG/1; MG/1
1 TABLET ORAL DAILY
Qty: 30 TABLET | Refills: 0 | OUTPATIENT
Start: 2024-07-02

## 2024-07-16 ENCOUNTER — OFFICE VISIT (OUTPATIENT)
Dept: PRIMARY CARE | Facility: CLINIC | Age: 29
End: 2024-07-16
Payer: COMMERCIAL

## 2024-07-16 ENCOUNTER — TELEPHONE (OUTPATIENT)
Dept: PRIMARY CARE | Facility: CLINIC | Age: 29
End: 2024-07-16

## 2024-07-16 VITALS
BODY MASS INDEX: 26.19 KG/M2 | WEIGHT: 176.8 LBS | SYSTOLIC BLOOD PRESSURE: 129 MMHG | HEART RATE: 84 BPM | HEIGHT: 69 IN | TEMPERATURE: 98.3 F | DIASTOLIC BLOOD PRESSURE: 78 MMHG

## 2024-07-16 DIAGNOSIS — J01.00 ACUTE NON-RECURRENT MAXILLARY SINUSITIS: Primary | ICD-10-CM

## 2024-07-16 DIAGNOSIS — F90.0 ATTENTION DEFICIT HYPERACTIVITY DISORDER (ADHD), PREDOMINANTLY INATTENTIVE TYPE: ICD-10-CM

## 2024-07-16 PROCEDURE — 99213 OFFICE O/P EST LOW 20 MIN: CPT | Performed by: NURSE PRACTITIONER

## 2024-07-16 RX ORDER — DOXYCYCLINE 100 MG/1
100 CAPSULE ORAL 2 TIMES DAILY
Qty: 20 CAPSULE | Refills: 0 | Status: SHIPPED | OUTPATIENT
Start: 2024-07-16 | End: 2024-07-26

## 2024-07-16 RX ORDER — DEXTROAMPHETAMINE SACCHARATE, AMPHETAMINE ASPARTATE, DEXTROAMPHETAMINE SULFATE AND AMPHETAMINE SULFATE 5; 5; 5; 5 MG/1; MG/1; MG/1; MG/1
TABLET ORAL
Qty: 30 TABLET | Refills: 0 | Status: SHIPPED | OUTPATIENT
Start: 2024-07-16

## 2024-07-16 RX ORDER — DEXTROAMPHETAMINE SACCHARATE, AMPHETAMINE ASPARTATE, DEXTROAMPHETAMINE SULFATE AND AMPHETAMINE SULFATE 7.5; 7.5; 7.5; 7.5 MG/1; MG/1; MG/1; MG/1
30 TABLET ORAL
Qty: 60 TABLET | Refills: 0 | Status: SHIPPED | OUTPATIENT
Start: 2024-07-16 | End: 2024-08-15

## 2024-07-16 ASSESSMENT — ENCOUNTER SYMPTOMS
COUGH: 0
FACIAL SWELLING: 0
SINUS PRESSURE: 0
WHEEZING: 0
HEADACHES: 0
FEVER: 0
SORE THROAT: 0
DIARRHEA: 0
MYALGIAS: 0
SHORTNESS OF BREATH: 0
ABDOMINAL PAIN: 0
CHEST TIGHTNESS: 1
ARTHRALGIAS: 0
FATIGUE: 0
EYE REDNESS: 0
RHINORRHEA: 1
PALPITATIONS: 0
EYE DISCHARGE: 0
VOMITING: 0
CHILLS: 0

## 2024-07-16 ASSESSMENT — PATIENT HEALTH QUESTIONNAIRE - PHQ9
2. FEELING DOWN, DEPRESSED OR HOPELESS: NOT AT ALL
SUM OF ALL RESPONSES TO PHQ9 QUESTIONS 1 AND 2: 0
1. LITTLE INTEREST OR PLEASURE IN DOING THINGS: NOT AT ALL

## 2024-07-16 NOTE — PROGRESS NOTES
"Subjective   Patient ID: Geovany Segundo is a 28 y.o. male who presents for Follow-up.  Last physical: 3/3/23    current sx-sinus congestion, white mucus, green hard \"efrem \"from his nose.   when did sx start-been building up for the last couple years  did pt take a covid-19 test? No  if yes when?  Last albuterol inh use-yesterday morning  Does pt want to discuss quitting smoking? No    Any other questions or concerns that pt wants to discuss today?  His psych moved out of state, but hasn't seen a new one yet because of the hours for his job.  He needs his adderall refilled, he has been out of it for 2 weeks.       MEGHAN Jacobo had brain aneurysm and stroke    Needs refill adderall; psychiatrist left the state  I have personally reviewed the OARRS report for this patient. I have considered the risks of abuse, dependence, addiction and diversion. No concerns.      sinus congestion, white mucus, green hard \"efrem \"from his nose.   been building up for the last couple years    no cough, sob, wheezing, fever, chills, muscle/jt pain, HA, ST, new loss of taste or smell, diarrhea, vomiting, nausea, abdominal pain, fatigue, weakness, red eye, rash, bruising, cyanosis, ear pain, ear pressure,  stuffy nose, post nasal drip, pain with deep breath, leg or foot swelling, calf pain    Selftxt-none  Using albuterol inh every am due to tight chest    No known exposure to COVID-19  No known exposure to strep  No known exposure to influenza  No one sick around the pt      Risk factors:  Chronic disease/comorbidities: none  not a healthcare worker  Age: not 65yrs of age and older      No care team member to display     Review of Systems   Constitutional:  Negative for chills, fatigue and fever.   HENT:  Positive for rhinorrhea. Negative for congestion, ear discharge, ear pain, facial swelling, postnasal drip, sinus pressure and sore throat.    Eyes:  Negative for discharge and redness.   Respiratory:  Positive for chest tightness. " Negative for cough, shortness of breath and wheezing.    Cardiovascular:  Negative for chest pain, palpitations and leg swelling.   Gastrointestinal:  Negative for abdominal pain, diarrhea and vomiting.   Musculoskeletal:  Negative for arthralgias and myalgias.   Skin:  Negative for rash.   Neurological:  Negative for headaches.       Objective   Visit Vitals  /78   Pulse 84   Temp 36.8 °C (98.3 °F)      BP Readings from Last 3 Encounters:   07/16/24 129/78   10/02/23 169/72   08/15/23 128/62     Wt Readings from Last 3 Encounters:   07/16/24 80.2 kg (176 lb 12.8 oz)   08/15/23 (!) 7.711 kg (17 lb)   05/08/23 86.2 kg (190 lb 1 oz)       Physical Exam  Constitutional:       Appearance: Normal appearance.   HENT:      Head: Normocephalic.      Right Ear: Tympanic membrane, ear canal and external ear normal.      Left Ear: Tympanic membrane, ear canal and external ear normal.      Nose: Nose normal.      Mouth/Throat:      Mouth: Mucous membranes are moist.      Pharynx: No oropharyngeal exudate or posterior oropharyngeal erythema.   Cardiovascular:      Rate and Rhythm: Normal rate and regular rhythm.      Heart sounds: Normal heart sounds.   Pulmonary:      Effort: Pulmonary effort is normal.      Breath sounds: Normal breath sounds. No wheezing, rhonchi or rales.   Lymphadenopathy:      Cervical: No cervical adenopathy.   Neurological:      Mental Status: He is alert.       Assessment/Plan   Diagnoses and all orders for this visit:  Acute non-recurrent maxillary sinusitis  -     doxycycline (Vibramycin) 100 mg capsule; Take 1 capsule (100 mg) by mouth 2 times a day for 10 days. Take with at least 8 ounces (large glass) of water, do not lie down for 30 minutes after  Attention deficit hyperactivity disorder (ADHD), predominantly inattentive type  -     amphetamine-dextroamphetamine (AdderalL) 30 mg tablet; Take 1 tablet (30 mg) by mouth 2 times daily (morning and midday).  -     amphetamine-dextroamphetamine  (Adderall) 20 mg tablet; 1 po after work  -     Referral to Psychiatry; Future

## 2024-07-16 NOTE — PATIENT INSTRUCTIONS
Refill adderall  Make appt with new psychiatrist    Doxycycline  Zyrtec 1 a day otc    Return for a physical    I will communicate with you via [employer] regarding messages and results. If you need help with this, you can call the support line at [phone].    IT WAS A PLEASURE TO SEE YOU TODAY. THANK YOU FOR CHOOSING US FOR YOUR HEALTHCARE NEEDS.

## 2024-07-16 NOTE — TELEPHONE ENCOUNTER
Pls call [facility] office   psychiatry  Pt stated that NP Tye had to move out of state.  My question is what is the plan for the pt to continue his medications. Is there someone who will be taking over his pts?  Are they scheduling appts for pts? Are they refilling the meds?

## 2024-07-17 NOTE — TELEPHONE ENCOUNTER
[facility] patients where being moved to other providers- He had one appt in March that he canceled, another appt in May that he no showed.   They have a referral ready for him for when he can call and schedule     Sent patient mychart message with number to call to schedule this appt.

## 2024-08-02 ENCOUNTER — APPOINTMENT (OUTPATIENT)
Dept: BEHAVIORAL HEALTH | Facility: CLINIC | Age: 29
End: 2024-08-02
Payer: COMMERCIAL

## 2024-08-05 ENCOUNTER — APPOINTMENT (OUTPATIENT)
Dept: BEHAVIORAL HEALTH | Facility: CLINIC | Age: 29
End: 2024-08-05
Payer: COMMERCIAL

## 2024-08-05 DIAGNOSIS — F90.0 ATTENTION DEFICIT HYPERACTIVITY DISORDER (ADHD), PREDOMINANTLY INATTENTIVE TYPE: Primary | ICD-10-CM

## 2024-08-05 PROCEDURE — 99215 OFFICE O/P EST HI 40 MIN: CPT | Performed by: NURSE PRACTITIONER

## 2024-08-05 RX ORDER — DEXTROAMPHETAMINE SACCHARATE, AMPHETAMINE ASPARTATE, DEXTROAMPHETAMINE SULFATE AND AMPHETAMINE SULFATE 7.5; 7.5; 7.5; 7.5 MG/1; MG/1; MG/1; MG/1
30 TABLET ORAL
Qty: 60 TABLET | Refills: 0 | Status: SHIPPED | OUTPATIENT
Start: 2024-08-15 | End: 2024-09-14

## 2024-08-05 RX ORDER — DEXTROAMPHETAMINE SACCHARATE, AMPHETAMINE ASPARTATE, DEXTROAMPHETAMINE SULFATE AND AMPHETAMINE SULFATE 5; 5; 5; 5 MG/1; MG/1; MG/1; MG/1
TABLET ORAL
Qty: 30 TABLET | Refills: 0 | Status: SHIPPED | OUTPATIENT
Start: 2024-08-15

## 2024-08-05 ASSESSMENT — PATIENT HEALTH QUESTIONNAIRE - PHQ9
2. FEELING DOWN, DEPRESSED OR HOPELESS: SEVERAL DAYS
1. LITTLE INTEREST OR PLEASURE IN DOING THINGS: SEVERAL DAYS
SUM OF ALL RESPONSES TO PHQ9 QUESTIONS 1 & 2: 2
10. IF YOU CHECKED OFF ANY PROBLEMS, HOW DIFFICULT HAVE THESE PROBLEMS MADE IT FOR YOU TO DO YOUR WORK, TAKE CARE OF THINGS AT HOME, OR GET ALONG WITH OTHER PEOPLE: SOMEWHAT DIFFICULT
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
## 2024-09-03 ENCOUNTER — TELEPHONE (OUTPATIENT)
Dept: PRIMARY CARE | Facility: CLINIC | Age: 29
End: 2024-09-03
Payer: COMMERCIAL

## 2024-09-10 ENCOUNTER — LAB (OUTPATIENT)
Dept: LAB | Facility: LAB | Age: 29
End: 2024-09-10
Payer: COMMERCIAL

## 2024-09-10 DIAGNOSIS — F90.9 ATTENTION DEFICIT HYPERACTIVITY DISORDER (ADHD), UNSPECIFIED ADHD TYPE: ICD-10-CM

## 2024-09-10 PROCEDURE — 80307 DRUG TEST PRSMV CHEM ANLYZR: CPT

## 2024-09-11 LAB
AMPHETAMINES UR QL SCN: NORMAL
BARBITURATES UR QL SCN: NORMAL
BENZODIAZ UR QL SCN: NORMAL
BZE UR QL SCN: NORMAL
CANNABINOIDS UR QL SCN: NORMAL
FENTANYL+NORFENTANYL UR QL SCN: NORMAL
METHADONE UR QL SCN: NORMAL
OPIATES UR QL SCN: NORMAL
OXYCODONE+OXYMORPHONE UR QL SCN: NORMAL
PCP UR QL SCN: NORMAL

## 2024-09-13 ENCOUNTER — APPOINTMENT (OUTPATIENT)
Dept: BEHAVIORAL HEALTH | Facility: CLINIC | Age: 29
End: 2024-09-13
Payer: COMMERCIAL

## 2024-09-15 LAB
AMPHET UR-MCNC: <50 NG/ML
MDA UR-MCNC: <200 NG/ML
MDEA UR-MCNC: <200 NG/ML
MDMA UR-MCNC: <200 NG/ML
METHAMPHET UR-MCNC: <200 NG/ML
PHENTERMINE UR CFM-MCNC: <200 NG/ML

## 2024-10-07 ENCOUNTER — APPOINTMENT (OUTPATIENT)
Dept: BEHAVIORAL HEALTH | Facility: CLINIC | Age: 29
End: 2024-10-07
Payer: COMMERCIAL

## 2024-10-07 DIAGNOSIS — F90.9 ATTENTION DEFICIT HYPERACTIVITY DISORDER (ADHD), UNSPECIFIED ADHD TYPE: ICD-10-CM

## 2024-10-07 DIAGNOSIS — F32.1 MAJOR DEPRESSIVE DISORDER, SINGLE EPISODE, MODERATE (MULTI): ICD-10-CM

## 2024-10-07 PROCEDURE — 99215 OFFICE O/P EST HI 40 MIN: CPT | Performed by: NURSE PRACTITIONER

## 2024-10-07 RX ORDER — DEXTROAMPHETAMINE SACCHARATE, AMPHETAMINE ASPARTATE MONOHYDRATE, DEXTROAMPHETAMINE SULFATE AND AMPHETAMINE SULFATE 7.5; 7.5; 7.5; 7.5 MG/1; MG/1; MG/1; MG/1
30 CAPSULE, EXTENDED RELEASE ORAL DAILY
Qty: 30 CAPSULE | Refills: 0 | Status: SHIPPED | OUTPATIENT
Start: 2024-10-07 | End: 2024-11-06

## 2024-10-07 RX ORDER — ESCITALOPRAM OXALATE 5 MG/1
5 TABLET ORAL DAILY
Qty: 30 TABLET | Refills: 0 | Status: SHIPPED | OUTPATIENT
Start: 2024-10-07 | End: 2024-11-06

## 2024-10-28 ENCOUNTER — LAB (OUTPATIENT)
Dept: LAB | Facility: LAB | Age: 29
End: 2024-10-28
Payer: COMMERCIAL

## 2024-10-28 DIAGNOSIS — F90.9 ATTENTION DEFICIT HYPERACTIVITY DISORDER (ADHD), UNSPECIFIED ADHD TYPE: ICD-10-CM

## 2024-10-28 PROCEDURE — 80349 CANNABINOIDS NATURAL: CPT

## 2024-10-28 PROCEDURE — 80307 DRUG TEST PRSMV CHEM ANLYZR: CPT

## 2024-10-29 LAB
AMPHETAMINES UR QL SCN: ABNORMAL
BARBITURATES UR QL SCN: ABNORMAL
BENZODIAZ UR QL SCN: ABNORMAL
BZE UR QL SCN: ABNORMAL
CANNABINOIDS UR QL SCN: ABNORMAL
FENTANYL+NORFENTANYL UR QL SCN: ABNORMAL
METHADONE UR QL SCN: ABNORMAL
OPIATES UR QL SCN: ABNORMAL
OXYCODONE+OXYMORPHONE UR QL SCN: ABNORMAL
PCP UR QL SCN: ABNORMAL

## 2024-11-01 LAB
AMPHET UR-MCNC: 1105 NG/ML
MDA UR-MCNC: <200 NG/ML
MDEA UR-MCNC: <200 NG/ML
MDMA UR-MCNC: <200 NG/ML
METHAMPHET UR-MCNC: <200 NG/ML
PHENTERMINE UR CFM-MCNC: <200 NG/ML

## 2024-11-02 LAB — CARBOXYTHC UR-MCNC: 136 NG/ML

## 2024-11-05 ENCOUNTER — APPOINTMENT (OUTPATIENT)
Dept: BEHAVIORAL HEALTH | Facility: CLINIC | Age: 29
End: 2024-11-05
Payer: COMMERCIAL

## 2024-11-05 VITALS
HEART RATE: 76 BPM | HEIGHT: 69 IN | BODY MASS INDEX: 24.81 KG/M2 | RESPIRATION RATE: 16 BRPM | SYSTOLIC BLOOD PRESSURE: 132 MMHG | WEIGHT: 167.5 LBS | TEMPERATURE: 98.8 F | DIASTOLIC BLOOD PRESSURE: 67 MMHG

## 2024-11-05 DIAGNOSIS — F32.0 MAJOR DEPRESSIVE DISORDER, SINGLE EPISODE, MILD (CMS-HCC): ICD-10-CM

## 2024-11-05 DIAGNOSIS — F90.9 ATTENTION DEFICIT HYPERACTIVITY DISORDER (ADHD), UNSPECIFIED ADHD TYPE: ICD-10-CM

## 2024-11-05 PROCEDURE — 3008F BODY MASS INDEX DOCD: CPT | Performed by: NURSE PRACTITIONER

## 2024-11-05 PROCEDURE — 99214 OFFICE O/P EST MOD 30 MIN: CPT | Performed by: NURSE PRACTITIONER

## 2024-11-05 RX ORDER — DEXTROAMPHETAMINE SACCHARATE, AMPHETAMINE ASPARTATE MONOHYDRATE, DEXTROAMPHETAMINE SULFATE AND AMPHETAMINE SULFATE 7.5; 7.5; 7.5; 7.5 MG/1; MG/1; MG/1; MG/1
30 CAPSULE, EXTENDED RELEASE ORAL DAILY
Qty: 30 CAPSULE | Refills: 0 | Status: SHIPPED | OUTPATIENT
Start: 2024-11-05 | End: 2024-12-05

## 2024-11-05 RX ORDER — ESCITALOPRAM OXALATE 5 MG/1
5 TABLET ORAL DAILY
Qty: 90 TABLET | Refills: 0 | Status: SHIPPED | OUTPATIENT
Start: 2024-11-05 | End: 2025-02-03

## 2024-11-05 RX ORDER — DEXTROAMPHETAMINE SACCHARATE, AMPHETAMINE ASPARTATE MONOHYDRATE, DEXTROAMPHETAMINE SULFATE AND AMPHETAMINE SULFATE 7.5; 7.5; 7.5; 7.5 MG/1; MG/1; MG/1; MG/1
30 CAPSULE, EXTENDED RELEASE ORAL DAILY
Qty: 30 CAPSULE | Refills: 0 | Status: SHIPPED | OUTPATIENT
Start: 2024-12-06 | End: 2025-01-05

## 2024-11-05 ASSESSMENT — COLUMBIA-SUICIDE SEVERITY RATING SCALE - C-SSRS
TOTAL  NUMBER OF ABORTED OR SELF INTERRUPTED ATTEMPTS LIFETIME: NO
TOTAL  NUMBER OF INTERRUPTED ATTEMPTS LIFETIME: NO
1. HAVE YOU WISHED YOU WERE DEAD OR WISHED YOU COULD GO TO SLEEP AND NOT WAKE UP?: NO
ATTEMPT LIFETIME: NO
2. HAVE YOU ACTUALLY HAD ANY THOUGHTS OF KILLING YOURSELF?: NO
6. HAVE YOU EVER DONE ANYTHING, STARTED TO DO ANYTHING, OR PREPARED TO DO ANYTHING TO END YOUR LIFE?: NO

## 2024-11-05 NOTE — PROGRESS NOTES
"Time in: 1035  Time out:1052      Preferred Name:  Geovany    Chief Complaint  \"A little bit better.\"      History of Present Illness:  Geovany Segundo is a 29 y.o. male patient with a chief complaint of medication management presenting to outpatient treatment for a scheduled psych outpatient psychiatric follow-up.    The patient reports, \"A little bit better.\" He reports that he was able to get his job back. He reports that the Lexapro has made a difference in his emilie. He reports that if something bad happens, he is able to respond more calmly rather than throw a \"temper tantrum and exploding.\" He reports that he did not realize that he was depressed. He reports that \"how I feel right now is pretty good.\" He acknowledges that life is stressful but that he is able to manage it better. He denies any suicidal ideation. He reports that his fiancee told him that he needed to get help for his depression. He realizes that after taking the medication that he notices a difference. He reports that prior to the medication, he was having trouble getting out of bed and having motivation. He reports that he has more motivation and it is easier to get out of bed. He reports that his father/boss \"likes it when I take my medication\" in reference to the Adderall. He reports that without the Adderall, he is very forgetful at work. He reports that when he takes his medication \"religiously,\" he is better able to work. He states that he is \"still on thin ice\" at work. He reports that the Adderall XR has been good this time and that it is effective within 30 minutes of taking it. He reports that around 4PM or 5PM when he is still working, he feels that it is becoming an issue that the Adderall is wearing off.  This writer discusses with the patient that his most recent drug screen on 10/28/2024 was positive for cannabis.  The patient does admit to having a \"slip up\" recently with marijuana use.  This writer discussed with the patient that " "his Adderall prescription will only continue if he abstains from all drugs including marijuana.  The patient is aware that if he is positive again in the future, the Adderall prescription will no longer be renewed.  He verbalizes understanding of this.  He is aware that he will likely have another urine drug screen prior to his next appointment.        Falls  History of falls: No  Have you fallen in the past 12 months: No        High Blood pressure  No        Depression Screening:   Endorses moderate depressive symptoms         Record Review: brief      Mental Status Exam:  General appearance: Appears state age, appropriate eye contact, adequate grooming and hygiene  Attitude: Calm, cooperative, and engaged in conversation.  Behavior: Appropriate eye contact.   Motor Activity: No psychomotor agitation or retardation. No abnormal movements, tremors or tics. No evidence of extrapyramidal symptoms or tardive dyskinesia.  Speech: Regular rate, rhythm, volume. Spontaneous, no pressured speech.  Mood: \"Better.\"   Affect: Appropriate, full range, mood congruent.  Thought Process: Linear, logical, and goal-directed. No loose associations or gross thought disorganization.  Thought Content: Denied current suicidal ideation or thoughts of harm to self, denied homicidal ideation or thoughts of harm to others. No delusional thinking elicited. No perseverations or obsessions identified.   Perception: Did not endorse auditory or visual hallucinations, did not appear to be responding to hallucinatory stimuli.   Cognition: Alert, oriented x3. Preserved attention span and concentration, recent and remote memory. Adequate fund of knowledge. No deficits in language.   Insight: Good, in regards to understanding mental health condition  Judgement: Good        Review of Systems  Eyes  no discharge, no pain  Ears, Nose, Mouth, Throat  no pain, no rhinorrhea, no dysphagia  Resp  no dyspnea, no cough  GI  no nausea, vomiting, diarrhea    " no urgency, no dysuria  Muskuloskeletal  no pain, no weakness  Integumentary  no rash  Endocrine   no polyurea  Hematologic  no bruising, no bleeding problems  CV  no palpitations, no pain  Pulm  no chest pain  Neuro  no weakness, no coordination problems, no dizziness  Constitutional  energy, appetite normal  Psychiatric  see psychiatric review of systems and HPI        Vitals:  Vitals:    11/05/24 1052   BP: 132/67   Pulse: 76   Resp: 16   Temp: 37.1 °C (98.8 °F)           OARRS:  Keisha Carlos, KERA-PATRICK on 11/5/2024 10:31 AM  I have personally reviewed the OARRS report for Geovany Segundo. I have considered the risks of abuse, dependence, addiction and diversion    Is the patient prescribed a combination of a benzodiazepine and opioid?  No    Last Urine Drug Screen   Recent Results (from the past 8760 hours)   Amphetamine Confirm, Urine    Collection Time: 10/28/24 12:56 PM   Result Value Ref Range    Methamphetamine Quant, Ur <200 ng/mL    MDA, Urine <200 ng/mL    MDEA, Urine <200 ng/mL    Phentermine,Urine <200 ng/mL    Amphetamines,Urine 1105 ng/mL    MDMA, Urine <200 ng/mL   Drug Screen, Urine With Reflex to Confirmation    Collection Time: 10/28/24 12:56 PM   Result Value Ref Range    Amphetamine Screen, Urine Presumptive Positive (A) Presumptive Negative    Barbiturate Screen, Urine Presumptive Negative Presumptive Negative    Benzodiazepines Screen, Urine Presumptive Negative Presumptive Negative    Cannabinoid Screen, Urine Presumptive Positive (A) Presumptive Negative    Cocaine Metabolite Screen, Urine Presumptive Negative Presumptive Negative    Fentanyl Screen, Urine Presumptive Negative Presumptive Negative    Opiate Screen, Urine Presumptive Negative Presumptive Negative    Oxycodone Screen, Urine Presumptive Negative Presumptive Negative    PCP Screen, Urine Presumptive Negative Presumptive Negative    Methadone Screen, Urine Presumptive Negative Presumptive Negative     Results unexpected.  Patient is positive for cannabinoid        Controlled Substance Agreement:  Date of the Last Agreement: 10/7/24    Reviewed Controlled Substance Agreement including but not limited to the benefits, risks, and alternatives to treatment with a Controlled Substance medication(s).     Stimulants:   What is the patient's goal of therapy?  Improved concentration and focus   Is this being achieved with current treatment? yes     Activities of Daily Living:   Is your overall impression that this patient is benefiting (symptom reduction outweighs side effects) from stimulant therapy? Yes      1. Physical Functioning: Better  2. Family Relationship: Better  3. Social Relationship: Better  4. Mood: Better  5. Sleep Patterns: Better  6. Overall Function: Better             Current Medications  Current Outpatient Medications on File Prior to Visit   Medication Sig Dispense Refill    albuterol (ProAir HFA) 90 mcg/actuation inhaler Inhale 2 puffs every 6 hours if needed for wheezing.      [DISCONTINUED] amphetamine-dextroamphetamine XR (Adderall XR) 30 mg 24 hr capsule Take 1 capsule (30 mg) by mouth once daily. Do not crush or chew. 30 capsule 0    [DISCONTINUED] escitalopram (Lexapro) 5 mg tablet Take 1 tablet (5 mg) by mouth once daily. 30 tablet 0    clotrimazole-betamethasone (Lotrisone) cream Apply 1 Application topically 2 times a day. Use no longer than 2wks. (Patient not taking: Reported on 11/5/2024) 45 g 1    [DISCONTINUED] amphetamine-dextroamphetamine (AdderalL) 30 mg tablet Take 1 tablet (30 mg) by mouth 2 times daily (morning and midday). Do not fill before August 15, 2024. 60 tablet 0     No current facility-administered medications on file prior to visit.         MEDICAL-DECISION MAKING  Moderate: 2 or more stable chronic illnesses with Prescription drug management          IMPRESSION  This is a 28-year-old male presenting for in person follow-up for treatment of ADHD and depression.  The patient has previously  been diagnosed with ADHD since childhood.  The patient is endorsing symptoms that also correlate with major depressive disorder, single episode, mild at this time and notes improvement in symptoms since starting Lexapro last month.  The patient reports less anger and more motivation since starting the Lexapro.  After the patient's drug screen and amphetamine level were negative on 9/10/2024, his Adderall dose was decreased and changed to Adderall XR 30 mg daily.  The patient does find some benefit from the Adderall XR 30 mg daily.  The patient's urine tox screen on 10/28/2024 was positive for amphetamines but was also positive for cannabinoids.  The patient had previously been aware that if he continues to use cannabis, his Adderall prescription will not continue.  The patient is aware that if he has another positive tox screen, his Adderall prescription will not be renewed.  The patient denies any suicidal ideation.  He is help seeking and future oriented.  He is agreeable to following up with this writer on 12/9/2024.  He is aware that he will likely have a drug screen prior to that appointment.      Diagnoses and all orders for this visit:  Attention deficit hyperactivity disorder (ADHD), unspecified ADHD type  -     Follow Up In Psychiatry  -     amphetamine-dextroamphetamine XR (Adderall XR) 30 mg 24 hr capsule; Take 1 capsule (30 mg) by mouth once daily. Do not crush or chew.  -     amphetamine-dextroamphetamine XR (Adderall XR) 30 mg 24 hr capsule; Take 1 capsule (30 mg) by mouth once daily. Do not crush or chew. Do not fill before December 6, 2024.  -     Follow Up In Psychiatry; Future  -     Drug Screen, Urine With Reflex to Confirmation; Future  -     Amphetamine Confirm, Urine; Future  Major depressive disorder, single episode, mild (CMS-HCC)  -     Follow Up In Psychiatry  -     escitalopram (Lexapro) 5 mg tablet; Take 1 tablet (5 mg) by mouth once daily.  -     Follow Up In Psychiatry; Future          Diagnoses  Problem List Items Addressed This Visit       ADHD    Relevant Medications    amphetamine-dextroamphetamine XR (Adderall XR) 30 mg 24 hr capsule    amphetamine-dextroamphetamine XR (Adderall XR) 30 mg 24 hr capsule (Start on 12/6/2024)    Other Relevant Orders    Follow Up In Psychiatry    Drug Screen, Urine With Reflex to Confirmation    Amphetamine Confirm, Urine     Other Visit Diagnoses       Major depressive disorder, single episode, mild (CMS-HCC)        Relevant Medications    escitalopram (Lexapro) 5 mg tablet    Other Relevant Orders    Follow Up In Psychiatry                Risk Assessment  Acute risk for suicide: Low  Chronic risk for suicide: Low         SI/HI ASSESSMENT  -Risk Assessment: Geovany Segundo is currently a low acute risk of suicide and self-harm due to no past suicide attempt(s) and not currently endorsing thoughts of suicide. Geovany Segundo is currently a low acute risk of violence and harm to others due to no past history of violence and not currently threatening others.  -Suicidal Risk Factors: , male, current psychiatric illness, and substance abuse  -Violence Risk Factors: male and access to weapons  -Protective Factors: Yazidism affiliation/spirituality, strong coping skills, sense of responsibility towards family, social support/connectedness, positive family relationships, hopefulness/future orientation, marriage/partnership, and employment  -Plan to Reduce Risk: Establish medication regimen, outpatient follow-up care, and increase coping skills .        [place] suicide severity rating scale  Suicidal and self-injurious behavior in the past 3 months: denies     Suicidal and self-injurious behavior in lifetime: Denies     Suicidal ideation (check most severe in past month): Denies     Activating events (recent):  Fiancee's health, work      Treatment history: Current medications/treatment     Other risk factors: Male and No children     Clinical status  (recent): Denies     Protective factors (recent): Identifies reasons for living, responsibility to family or others, living with family/supports, supportive social network or family, highly spiritual, and engaged in work or school     Other protective factors: Age, /in relationship, and Employed/in school     Describe any suicidal, self-injurious, or aggressive behavior (include dates): denies        PLAN  -Continue Adderall to XR 30 mg by mouth daily for ADHD; prescription sent for Adderall XR 30 mg by mouth daily, dispensed 30 with no refills with an additional prescription for Adderall XR 30 mg by mouth daily, dispensed 30 with no refills to be filled on 12/6/2024  -Urine drug screen on 10/28/2024 was positive for cannabinoids, repeat urine tox screen on 12/4/2024; controlled substance agreement signed on 10/7/2024; in person visit completed on 11/5/2024  -Continue Lexapro 5 mg by mouth daily for depression; prescription sent for Lexapro 5 mg by mouth daily, dispensed 90 with no refills  -Follow-up with this provider on 12/9/2024  -Risks/benefits/assessment of medication interventions discussed with pt; pt agreeable to plan. Will continue to monitor for symptoms management and side effects and adjust plan as needed.  -Motivational interviewing to increase coping skills/behavior regulation.  -Safety plan reviewed.  -Call  Psychiatry at [phone] or communicate through [facility] [place] with issues .   -For [facility] residents, [facility] is a 24/7 hotline you can call for assistance at [phone]) 775-5117. Please call 881 or go to your closest Emergency Room if you feel worse. This includes thoughts of hurting yourself or anyone else, or having other troubles such as hearing voices, seeing visions, or having new and scary thoughts about the people around you.      Review with patient: Treatment plan reviewed with the patient.  Medication risks/benefit reviewed with the patient      Time Spent:    Prep  time: 2 minutes  Direct patient time: 17 minutes  Documentation time: 10 minutes  Total time: 29 minutes    KERA Bernard-CNP

## 2024-12-09 ENCOUNTER — APPOINTMENT (OUTPATIENT)
Dept: BEHAVIORAL HEALTH | Facility: CLINIC | Age: 29
End: 2024-12-09
Payer: COMMERCIAL

## 2025-03-06 ENCOUNTER — TELEPHONE (OUTPATIENT)
Dept: PRIMARY CARE | Facility: CLINIC | Age: 30
End: 2025-03-06
Payer: COMMERCIAL

## 2025-03-06 NOTE — TELEPHONE ENCOUNTER
[facility] is requesting a referral for ENT.  Please advise.  He said he needs heavy duty antibiotics even if you can prescribe him something he still would like  to see someone in ENT.

## 2025-03-07 NOTE — TELEPHONE ENCOUNTER
What symptoms make him want to see an ENT?  I have to put a diagnosis with the referral    What sx does he need an antibiotic for?  If it is sinus then he can set up a virtual appt with a  provider at this point. He can do this by googling  virtual  Or he can go to an [facility]

## 2025-03-20 ENCOUNTER — APPOINTMENT (OUTPATIENT)
Dept: PRIMARY CARE | Facility: CLINIC | Age: 30
End: 2025-03-20
Payer: COMMERCIAL

## 2025-03-20 ASSESSMENT — ENCOUNTER SYMPTOMS
HEADACHES: 0
WHEEZING: 0
ARTHRALGIAS: 0
CHILLS: 0
ABDOMINAL PAIN: 0
MYALGIAS: 0
DIARRHEA: 0
COUGH: 0
PALPITATIONS: 0
EYE REDNESS: 0
FEVER: 0
FATIGUE: 0
EYE DISCHARGE: 0
VOMITING: 0
RHINORRHEA: 0
FACIAL SWELLING: 0
SINUS PRESSURE: 0
SHORTNESS OF BREATH: 0
SORE THROAT: 0
CHEST TIGHTNESS: 0

## 2025-03-20 NOTE — PROGRESS NOTES
Subjective   Patient ID: Geovany Segundo is a 29 y.o. male who presents for No chief complaint on file..  Last physical: 3/3/23; has appt in July for wellness  12/6/24 [facility] for suicidal ideation    Pt wants ENT referral.   What symptoms make him want to see an ENT?      Does he have any current sx?  If yes what are they  When did they start?  Last albuterol inh use?      HPI    no cough, sob, wheezing, tight upper chest, fever, chills, muscle/jt pain, HA, ST, new loss of taste or smell, diarrhea, vomiting, nausea, abdominal pain, fatigue, weakness, red eye, rash, bruising, cyanosis, ear pain, ear pressure, runny nose, stuffy nose, post nasal drip, pain with deep breath, leg or foot swelling, calf pain  loss of appetite-  fluids-  has urinated at least 3 times in 24hrs  Seasonal allergies-  Selftxt-    No known exposure to COVID-19  No known exposure to strep  No known exposure to influenza  No one sick around the pt      Risk factors:  Chronic disease/comorbidities: none  not a healthcare worker  Age: not 65yrs of age and older      No care team member to display     Review of Systems   Constitutional:  Negative for chills, fatigue and fever.   HENT:  Negative for congestion, ear discharge, ear pain, facial swelling, postnasal drip, rhinorrhea, sinus pressure and sore throat.    Eyes:  Negative for discharge and redness.   Respiratory:  Negative for cough, chest tightness, shortness of breath and wheezing.    Cardiovascular:  Negative for chest pain, palpitations and leg swelling.   Gastrointestinal:  Negative for abdominal pain, diarrhea and vomiting.   Musculoskeletal:  Negative for arthralgias and myalgias.   Skin:  Negative for rash.   Neurological:  Negative for headaches.       Objective   There were no vitals taken for this visit.   BP Readings from Last 3 Encounters:   11/05/24 132/67   07/16/24 129/78   10/02/23 169/72     Wt Readings from Last 3 Encounters:   11/05/24 76 kg (167  lb 8 oz)   07/16/24 80.2 kg (176 lb 12.8 oz)   08/15/23 (!) 7.711 kg (17 lb)       Physical Exam  Constitutional:       Appearance: Normal appearance.   HENT:      Head: Normocephalic.      Right Ear: Tympanic membrane, ear canal and external ear normal.      Left Ear: Tympanic membrane, ear canal and external ear normal.      Nose: Nose normal.      Mouth/Throat:      Mouth: Mucous membranes are moist.      Pharynx: No oropharyngeal exudate or posterior oropharyngeal erythema.   Cardiovascular:      Rate and Rhythm: Normal rate and regular rhythm.      Heart sounds: Normal heart sounds.   Pulmonary:      Effort: Pulmonary effort is normal.      Breath sounds: Normal breath sounds. No wheezing, rhonchi or rales.   Lymphadenopathy:      Cervical: No cervical adenopathy.   Neurological:      Mental Status: He is alert.       Assessment/Plan   {Assess/PlanSmartLinks:14699}      See patient instructions for full plan

## 2025-03-27 ENCOUNTER — APPOINTMENT (OUTPATIENT)
Dept: PRIMARY CARE | Facility: CLINIC | Age: 30
End: 2025-03-27
Payer: COMMERCIAL

## 2025-05-14 NOTE — PROGRESS NOTES
Facial Plastic & Reconstructive Surgery    Reason for consult: Nasal crusting, post nasal drip, possible septal perforation    Chief Complaint: Yellow/green nasal drainage, PND, Obstructed breathing    Referring Provider: Self    Previous Otolaryngology Provider: No    Previous documentation for this patient was extensively reviewed including specific reasons for evaluation. Complex nature of complaint and medical issues prompting evaluation for surgical consideration by facial plastic & reconstructive surgeon.    Geovany Segundo is a 29 y.o. male with PMHx of MDD/CHEMA, ADHD, hx of multidrug abuse ~3 years (last time snorted cocaine/used heroine 2019), current nightly marijuana smoker, 1/2 ppd daily smoker, asthma, mandible fracture d/t skateboard incident s/p ORIF 2023, who presents in consultation for the evaluation of possible septal perforation.    Endorses large chunks of yellow/green dry crusts from nose over past 2 years which he has attempted to dislodge with metal ear tool multiple times.  Thinks he has made his septum worse.     IMAGING: No    Previous nasal surgery: No    Trauma history: Possibly following skateboard incident 2023, however has been using ear tool to pick at septal crusting    Sleep apnea or snoring history: No    Allergic rhinitis, sinusitis history: Possible allergies    Smoking: Daily    Aesthetic concern: No    Past Medical History  He has a past medical history of Personal history of other diseases of male genital organs.    Surgical History  He has no past surgical history on file.     Social History  He reports that he has been smoking cigarettes. He started smoking about 12 years ago. He has a 12.6 pack-year smoking history. He has never used smokeless tobacco. He reports current alcohol use of about 6.0 standard drinks of alcohol per week. He reports that he does not currently use drugs.    Family History  Family History[1]     Allergies  Patient has no known  allergies.    Physical exam    General: Well-developed and well-nourished in appearance.  Skin: No rashes or concerning lesions on the visible portions of the skin.  Eyes: Extraocular movements intact. Visual fields grossly normal.  Ears: Pinna are normal in shape and position. External canals are patent.  Oral Cavity/Oropharynx: Dentition is intact. Mucous membranes moist. No masses or lesions.  Respiratory: No respiratory distress. Quiet breathing without stertor or stridor.  Cardiovascular: Regular rate and rhythm. Warm extremities with equal pulses.  Psych: Normal mood and affect. Judgement and insight appropriate.  Neuro: Alert and oriented. CN II-XII grossly intact. No focal deficits.  Musculoskeletal: Gait intact. Moves all extremities well without apparent deformities.    A comprehensive facial exam was performed with the following highlights:    Nasal skin type: Mod    Internal exam:   Septum: Midline, ~2cm wide by ~1.5 cm high anterior septal perforation; caudal septum intact.   Inferior turbinates: Mildly hypertrophic bl  Nasal valve angle: Normal bl    Intranasal examination performed with limited view on anterior rhinoscopy.    Procedure: Rigid diagnostic nasal endoscopy  Teresa Florez PA-C   Anesthesia: None  Timeout: Performed  Findings: A 0-degree rigid endoscope was passed through the patient's bilateral naris. The first pass was along the floor of the nose to the nasopharynx. The second pass was to the area of the middle meatus. The third pass was to the sphenoethmoidal recess.    Septum: Midline, Large dark green crust removed from inferior aspect of perforation, ~2cm wide by ~1.5 cm high anterior septal perforation; caudal septum intact.   Internal Nasal Valve: Angle is normal bilaterally.    Right nasal cavity:  Inferior turbinate: 2+  Inferior meatus: Clear, no discharge, no polyps/masses/lesions  Middle meatus: Clear, no discharge, no polyps/masses/lesions    Left nasal cavity:  Inferior  turbinate: 2+  Inferior meatus: Clear, no discharge, no polyps/masses/lesions  Middle meatus: Clear, no discharge, no polyps/masses/lesions  Nasopharynx: Clear, no discharge, no masses/lesions    Modified Bryan Maneuver: Performed with a cotton tipped applicator, does not markedly improve breathing bilaterally.    Plan:  Nightly Xyzal  AYR Gel, mupirocin TID  RTC in 3 months for perf followup  Smoking cessation discussed and patient amenable to plan.     Teresa Florez PA-C         [1] No family history on file.

## 2025-05-15 ENCOUNTER — OFFICE VISIT (OUTPATIENT)
Facility: CLINIC | Age: 30
End: 2025-05-15
Payer: COMMERCIAL

## 2025-05-15 VITALS — WEIGHT: 168.8 LBS | HEIGHT: 69 IN | BODY MASS INDEX: 25 KG/M2

## 2025-05-15 DIAGNOSIS — J34.89 NASAL DRAINAGE: ICD-10-CM

## 2025-05-15 DIAGNOSIS — R09.82 PND (POST-NASAL DRIP): ICD-10-CM

## 2025-05-15 DIAGNOSIS — J34.89: Primary | ICD-10-CM

## 2025-05-15 DIAGNOSIS — J34.89 NASAL CRUSTING: ICD-10-CM

## 2025-05-15 PROCEDURE — 3008F BODY MASS INDEX DOCD: CPT | Performed by: PHYSICIAN ASSISTANT

## 2025-05-15 PROCEDURE — 31231 NASAL ENDOSCOPY DX: CPT | Performed by: PHYSICIAN ASSISTANT

## 2025-05-15 PROCEDURE — 99204 OFFICE O/P NEW MOD 45 MIN: CPT | Performed by: PHYSICIAN ASSISTANT

## 2025-05-15 RX ORDER — SODIUM CHLORIDE/ALOE VERA
1 GEL (GRAM) NASAL 3 TIMES DAILY
Qty: 14.1 G | Refills: 3 | Status: SHIPPED | OUTPATIENT
Start: 2025-05-15 | End: 2025-06-14

## 2025-05-15 RX ORDER — MUPIROCIN 20 MG/G
OINTMENT TOPICAL
Qty: 30 G | Refills: 11 | Status: SHIPPED | OUTPATIENT
Start: 2025-05-15

## 2025-05-15 RX ORDER — LEVOCETIRIZINE DIHYDROCHLORIDE 5 MG/1
5 TABLET, FILM COATED ORAL EVERY EVENING
Qty: 30 TABLET | Refills: 11 | Status: SHIPPED | OUTPATIENT
Start: 2025-05-15 | End: 2026-05-15

## 2025-05-15 ASSESSMENT — PATIENT HEALTH QUESTIONNAIRE - PHQ9
2. FEELING DOWN, DEPRESSED OR HOPELESS: SEVERAL DAYS
1. LITTLE INTEREST OR PLEASURE IN DOING THINGS: SEVERAL DAYS
SUM OF ALL RESPONSES TO PHQ9 QUESTIONS 1 AND 2: 2
10. IF YOU CHECKED OFF ANY PROBLEMS, HOW DIFFICULT HAVE THESE PROBLEMS MADE IT FOR YOU TO DO YOUR WORK, TAKE CARE OF THINGS AT HOME, OR GET ALONG WITH OTHER PEOPLE: SOMEWHAT DIFFICULT

## 2025-05-15 ASSESSMENT — PAIN SCALES - GENERAL: PAINLEVEL_OUTOF10: 8

## 2025-07-10 ENCOUNTER — TELEPHONE (OUTPATIENT)
Dept: PRIMARY CARE | Facility: CLINIC | Age: 30
End: 2025-07-10
Payer: COMMERCIAL

## 2025-07-10 NOTE — TELEPHONE ENCOUNTER
Attempted to reach pt 4x to rs CPE on 7/18 (MT out of office)    -mychart sent to rs 6/3 @ 8106a  LVMM for patient to RS 6/6/25 4:00  LVMM for patient to RC to RS 6/9/25 5:10  -called to rs, mailbox full 7/10/25 @ 398o       Should we cancel apt? Send letter? Please advise.

## 2025-07-15 ENCOUNTER — APPOINTMENT (OUTPATIENT)
Facility: CLINIC | Age: 30
End: 2025-07-15
Payer: COMMERCIAL

## 2025-07-18 ENCOUNTER — APPOINTMENT (OUTPATIENT)
Dept: PRIMARY CARE | Facility: CLINIC | Age: 30
End: 2025-07-18
Payer: COMMERCIAL